# Patient Record
Sex: MALE | NOT HISPANIC OR LATINO | URBAN - METROPOLITAN AREA
[De-identification: names, ages, dates, MRNs, and addresses within clinical notes are randomized per-mention and may not be internally consistent; named-entity substitution may affect disease eponyms.]

---

## 2021-06-08 ENCOUNTER — WEB ENCOUNTER (OUTPATIENT)
Dept: URBAN - METROPOLITAN AREA CLINIC 23 | Facility: CLINIC | Age: 56
End: 2021-06-08

## 2021-06-08 ENCOUNTER — OFFICE VISIT (OUTPATIENT)
Dept: URBAN - METROPOLITAN AREA CLINIC 23 | Facility: CLINIC | Age: 56
End: 2021-06-08
Payer: COMMERCIAL

## 2021-06-08 ENCOUNTER — LAB OUTSIDE AN ENCOUNTER (OUTPATIENT)
Dept: URBAN - METROPOLITAN AREA CLINIC 23 | Facility: CLINIC | Age: 56
End: 2021-06-08

## 2021-06-08 DIAGNOSIS — Z22.322 COLONIZATION WITH MULTIDRUG-RESISTANT BACTERIA: ICD-10-CM

## 2021-06-08 DIAGNOSIS — Z12.11 COLON CANCER SCREENING: ICD-10-CM

## 2021-06-08 DIAGNOSIS — R10.32 LLQ ABDOMINAL PAIN: ICD-10-CM

## 2021-06-08 PROCEDURE — 74177 CT ABD & PELVIS W/CONTRAST: CPT | Performed by: INTERNAL MEDICINE

## 2021-06-08 PROCEDURE — 99204 OFFICE O/P NEW MOD 45 MIN: CPT | Performed by: INTERNAL MEDICINE

## 2021-06-08 PROCEDURE — 99244 OFF/OP CNSLTJ NEW/EST MOD 40: CPT | Performed by: INTERNAL MEDICINE

## 2021-06-08 RX ORDER — BRINZOLAMIDE/BRIMONIDINE TARTRATE 10; 2 MG/ML; MG/ML
SUSPENSION/ DROPS OPHTHALMIC
Qty: 0 | Refills: 0 | Status: DISCONTINUED | COMMUNITY
Start: 1900-01-01

## 2021-06-08 RX ORDER — LISINOPRIL 2.5 MG/1
TABLET ORAL
Qty: 0 | Refills: 0 | Status: DISCONTINUED | COMMUNITY
Start: 1900-01-01

## 2021-06-08 RX ORDER — MULTIVITAMIN
1 TABLET TABLET ORAL ONCE A DAY
Status: ACTIVE | COMMUNITY

## 2021-06-08 RX ORDER — TRAVOPROST 0.04 MG/ML
SOLUTION/ DROPS OPHTHALMIC
Qty: 0 | Refills: 0 | Status: DISCONTINUED | COMMUNITY
Start: 1900-01-01

## 2021-06-08 RX ORDER — ATORVASTATIN CALCIUM 20 MG/1
TABLET, FILM COATED ORAL
Qty: 0 | Refills: 0 | Status: DISCONTINUED | COMMUNITY
Start: 1900-01-01

## 2021-06-08 NOTE — HPI-TODAY'S VISIT:
- 54 yo  male, referred by Dr. Harish Cabrera for further evaluation of GI complaints as detailed below - Has had intermittent LLQ abdominal pain over the past month.  Has only occurred about 3 times, typically after eating.  Describes a mild pain, 1/10 in intensity, dull, non-radiating.  Usually lasts about 3 minutes and then goes away on its own. - Patient denies changes in bowel habits, diarrhea, constipation, or rectal bleeding.  He had a negative screening colonoscopy with Dr. Pang in 2016.  At that time a repeat screening colonoscopy in 10 years was recommended. - Patient was recently found to have elevated PSA levels.  His urologist (Dr. Malachi Bloom) wants to do a prostate biopsy.  However, a rectal swab was done first, and it revealed colonization with multidrug-resistant Enterobacter.  Conservative measures were tried, including dietary fiber and probiotics.  However, re-check rectal swab revealed persistent colonization with MDR Enterobacter.  Hence patient has been referred to see an Infectious Diseases specialist.  In this regard, the patient states he will be seeing Dr. Syl East at [facility] on June 29. - States he recently had bloodwork done at PCPs office - Denies family history of GI malignancies in any first degree relatives - Denies heartburn symptoms or history of chronic GERD

## 2021-06-09 ENCOUNTER — TELEPHONE ENCOUNTER (OUTPATIENT)
Dept: URBAN - METROPOLITAN AREA CLINIC 96 | Facility: CLINIC | Age: 56
End: 2021-06-09

## 2021-06-25 ENCOUNTER — TELEPHONE ENCOUNTER (OUTPATIENT)
Dept: URBAN - METROPOLITAN AREA CLINIC 23 | Facility: CLINIC | Age: 56
End: 2021-06-25

## 2021-06-27 PROBLEM — 275978004 COLON CANCER SCREENING: Status: ACTIVE | Noted: 2021-06-11

## 2021-07-21 ENCOUNTER — OFFICE VISIT (OUTPATIENT)
Dept: URBAN - METROPOLITAN AREA LAB 3 | Facility: LAB | Age: 56
End: 2021-07-21

## 2021-09-10 ENCOUNTER — OFFICE VISIT (OUTPATIENT)
Dept: URBAN - METROPOLITAN AREA SURGERY CENTER 15 | Facility: SURGERY CENTER | Age: 56
End: 2021-09-10
Payer: COMMERCIAL

## 2021-09-10 ENCOUNTER — CLAIMS CREATED FROM THE CLAIM WINDOW (OUTPATIENT)
Dept: URBAN - METROPOLITAN AREA CLINIC 4 | Facility: CLINIC | Age: 56
End: 2021-09-10
Payer: COMMERCIAL

## 2021-09-10 DIAGNOSIS — Z12.11 AVERAGE RISK FOR CRC. DUE TO PT'S CO-MORBID STATE WITH END STAGE DEMENTIA, HIGH RISK FOR ANESTHESIA (PER NEUROLOGY); INABILITY TO TAKE A BOWEL PREP....WOULD NOT ADVISE ANY COLORECTAL CANCER SCREENING INCLUDING STOOL TEST FOR FECAL BLOOD.: ICD-10-CM

## 2021-09-10 DIAGNOSIS — K63.5 BENIGN COLON POLYP: ICD-10-CM

## 2021-09-10 DIAGNOSIS — K63.89 POLYP, HYPERPLASTIC: ICD-10-CM

## 2021-09-10 PROCEDURE — 88305 TISSUE EXAM BY PATHOLOGIST: CPT | Performed by: PATHOLOGY

## 2021-09-10 PROCEDURE — 45380 COLONOSCOPY AND BIOPSY: CPT | Performed by: INTERNAL MEDICINE

## 2021-09-10 PROCEDURE — 45385 COLONOSCOPY W/LESION REMOVAL: CPT | Performed by: INTERNAL MEDICINE

## 2021-09-10 PROCEDURE — G8907 PT DOC NO EVENTS ON DISCHARG: HCPCS | Performed by: INTERNAL MEDICINE

## 2021-09-10 RX ORDER — MULTIVITAMIN
1 TABLET TABLET ORAL ONCE A DAY
Status: ACTIVE | COMMUNITY

## 2021-10-03 ENCOUNTER — TELEPHONE ENCOUNTER (OUTPATIENT)
Dept: URBAN - METROPOLITAN AREA CLINIC 23 | Facility: CLINIC | Age: 56
End: 2021-10-03

## 2021-10-08 LAB
A/G RATIO: 1
ACTIN (SMOOTH MUSCLE) ANTIBODY: 2
ALBUMIN: 4.9
ALKALINE PHOSPHATASE: 55
ALPHA-1-ANTITRYPSIN, SERUM: 127
ALT (SGPT): 25
ANTIMYELOPEROXIDASE (MPO) ABS: <9
ANTIPROTEINASE 3 (PR-3) ABS: <3.5
AST (SGOT): 27
ATYPICAL PANCA: (no result)
BANDS: (no result)
BASO (ABSOLUTE): 0.1
BASOS: 1
BILIRUBIN, DIRECT: 0.11
BILIRUBIN, TOTAL: 0.4
BLASTS/BLAST LIKE CELLS: (no result)
BUN/CREATININE RATIO: 13
BUN: 13
CALCIUM: 9.9
CARBON DIOXIDE, TOTAL: 24
CERULOPLASMIN: 23.4
CHLORIDE: 99
CREATININE: 0.98
CYTOPLASMIC (C-ANCA): (no result)
EGFR IF AFRICN AM: 100
EGFR IF NONAFRICN AM: 86
EOS (ABSOLUTE): 0.1
EOS: 1
FERRITIN, SERUM: 231
GLOBULIN, TOTAL: 5.1
GLUCOSE: 234
HBSAG SCREEN: NEGATIVE
HEMATOCRIT: 24.8
HEMATOLOGY COMMENTS:: (no result)
HEMOGLOBIN: 8.3
HEP A AB, IGM: NEGATIVE
HEP A AB, TOTAL: POSITIVE
HEP B CORE AB, IGM: NEGATIVE
HEP B CORE AB, TOT: NEGATIVE
HEP B SURFACE AB, QUAL: NON REACTIVE
HEP C VIRUS AB: <0.1
IMMATURE CELLS: (no result)
IMMATURE GRANS (ABS): (no result)
IMMATURE GRANULOCYTES: (no result)
INR: 1.1
IRON BIND.CAP.(TIBC): 230
IRON SATURATION: 43
IRON: 100
LYMPHS (ABSOLUTE): 6
LYMPHS: 65
MCH: 32.5
MCHC: 33.5
MCV: 97
MEGAKARYOCYTES: (no result)
METAMYELOCYTES: (no result)
MITOCHONDRIAL (M2) ANTIBODY: <20
MONOCYTES(ABSOLUTE): 0.5
MONOCYTES: 5
MYELOCYTES: 5
NEUTROPHILS (ABSOLUTE): 2.1
NEUTROPHILS: 23
NRBC: 19
OTHER, LINEAGE UNCERTAIN: (no result)
PERINUCLEAR (P-ANCA): (no result)
PLATELETS: 50
POTASSIUM: 4.2
PROMYELOCYTES: (no result)
PROTEIN, TOTAL: 10
PROTHROMBIN TIME: 11.1
RBC: 2.55
RDW: 16
SODIUM: 137
UIBC: 130
WBC: 9.3

## 2021-11-03 ENCOUNTER — TELEPHONE ENCOUNTER (OUTPATIENT)
Dept: URBAN - METROPOLITAN AREA CLINIC 23 | Facility: CLINIC | Age: 56
End: 2021-11-03

## 2021-11-03 ENCOUNTER — TELEPHONE ENCOUNTER (OUTPATIENT)
Dept: URBAN - METROPOLITAN AREA CLINIC 6 | Facility: CLINIC | Age: 56
End: 2021-11-03

## 2021-11-15 ENCOUNTER — OFFICE VISIT (OUTPATIENT)
Dept: URBAN - METROPOLITAN AREA CLINIC 23 | Facility: CLINIC | Age: 56
End: 2021-11-15
Payer: COMMERCIAL

## 2021-11-15 VITALS
WEIGHT: 159 LBS | DIASTOLIC BLOOD PRESSURE: 94 MMHG | HEIGHT: 70 IN | SYSTOLIC BLOOD PRESSURE: 147 MMHG | BODY MASS INDEX: 22.76 KG/M2 | HEART RATE: 94 BPM | TEMPERATURE: 96.8 F

## 2021-11-15 DIAGNOSIS — R94.5 ELEVATED LIVER FUNCTION TESTS: ICD-10-CM

## 2021-11-15 DIAGNOSIS — R93.5 ABNORMAL CT OF THE ABDOMEN: ICD-10-CM

## 2021-11-15 DIAGNOSIS — C90.00 MULTIPLE MYELOMA, REMISSION STATUS UNSPECIFIED: ICD-10-CM

## 2021-11-15 DIAGNOSIS — K86.2 PANCREATIC CYST: ICD-10-CM

## 2021-11-15 DIAGNOSIS — R10.9 ABDOMINAL PAIN OF MULTIPLE SITES: ICD-10-CM

## 2021-11-15 PROCEDURE — 99214 OFFICE O/P EST MOD 30 MIN: CPT | Performed by: INTERNAL MEDICINE

## 2021-11-15 RX ORDER — MULTIVITAMIN
1 TABLET TABLET ORAL ONCE A DAY
Status: ACTIVE | COMMUNITY

## 2021-11-21 PROBLEM — 109989006: Status: ACTIVE | Noted: 2021-10-03

## 2023-08-29 ENCOUNTER — OFFICE VISIT (OUTPATIENT)
Dept: URBAN - METROPOLITAN AREA CLINIC 23 | Facility: CLINIC | Age: 58
End: 2023-08-29
Payer: COMMERCIAL

## 2023-08-29 VITALS
HEART RATE: 65 BPM | WEIGHT: 171 LBS | BODY MASS INDEX: 24.48 KG/M2 | TEMPERATURE: 98.1 F | SYSTOLIC BLOOD PRESSURE: 108 MMHG | DIASTOLIC BLOOD PRESSURE: 69 MMHG | HEIGHT: 70 IN

## 2023-08-29 DIAGNOSIS — C90.01 MULTIPLE MYELOMA IN REMISSION: ICD-10-CM

## 2023-08-29 DIAGNOSIS — K86.2 PANCREATIC CYST: ICD-10-CM

## 2023-08-29 DIAGNOSIS — R93.5 ABNORMAL ABDOMINAL MRI: ICD-10-CM

## 2023-08-29 PROBLEM — 94704006: Status: ACTIVE | Noted: 2023-08-29

## 2023-08-29 PROCEDURE — 99214 OFFICE O/P EST MOD 30 MIN: CPT | Performed by: INTERNAL MEDICINE

## 2023-08-29 RX ORDER — MULTIVITAMIN
1 TABLET TABLET ORAL ONCE A DAY
Status: ACTIVE | COMMUNITY

## 2023-08-29 NOTE — HPI-TODAY'S VISIT:
- 58 yo  male who returns for follow-up of small pancreatic cysts; I last saw him in November 2021 - Pancreatic protocol MRI and MRCP done in October 2021 was notable for tiny pancreatic cysts in the head and uncinate process measuring 4 mm each.  No pancreatic ductal dilation.  The radiologist had recommended a repeat study in 1-2 years. - Denies prior history of pancreatitis.  Denies current or past history of alcohol abuse.  States his last drink was a social one about 3 years ago. - Denies hematochezia, change in bowel habits, or weight loss - Denies family history of GI malignancies in any first degree relatives - Denies heartburn symptoms or history of chronic GERD - Patient has multiple myeloma, for which he follows with Dr. Norman Hernandez, Medical Oncologist at the [facility] [address].  Patient states his multiple myeloma has been in remission since 2021, with the medications he is on. - I have discussed with the patient the findings of previous endoscopic procedures, pathology results, pertinent labs, and pertinent radiology results.  All questions were answered to their satisfaction.

## 2023-08-29 NOTE — PHYSICAL EXAM PSYCH:
OUTPATIENT/AMBULATORY HISTORY AND PHYSICAL    HISTORY  Chief Complaint: Blurred Vision     Present History:    Past History: High BP, Throat Problems, Colon Cancer    Social History:    Family History: Glaucoma    Emotional, Behavioral & Social Status:    Allergies: ALLERGIES:  Patient has no known allergies.     Medications: See nurses notes    PHYSICAL EXAMINATION  Visit Vitals  LMP  (LMP Unknown)         Chest:   Lungs: Normal   Heart: Normal    Abdomen: Soft    Extremities: WNL    OTHER PERTINENT EXAMINATION: VA: 20/70 ; +2NS, +2AC/PC    IMPRESSSIONS: Combination Cataract and POAG, severe OD    PLAN: ECCE with IOL implant and ABiC OD normal mood with appropriate affect

## 2023-09-14 ENCOUNTER — DASHBOARD ENCOUNTERS (OUTPATIENT)
Age: 58
End: 2023-09-14